# Patient Record
Sex: FEMALE | Race: OTHER | URBAN - METROPOLITAN AREA
[De-identification: names, ages, dates, MRNs, and addresses within clinical notes are randomized per-mention and may not be internally consistent; named-entity substitution may affect disease eponyms.]

---

## 2022-05-02 ENCOUNTER — OFFICE VISIT (OUTPATIENT)
Dept: OBGYN CLINIC | Facility: CLINIC | Age: 42
End: 2022-05-02
Payer: COMMERCIAL

## 2022-05-02 VITALS
SYSTOLIC BLOOD PRESSURE: 110 MMHG | WEIGHT: 148 LBS | HEART RATE: 94 BPM | DIASTOLIC BLOOD PRESSURE: 70 MMHG | BODY MASS INDEX: 27.23 KG/M2 | HEIGHT: 62 IN

## 2022-05-02 DIAGNOSIS — N94.6 DYSMENORRHEA: ICD-10-CM

## 2022-05-02 DIAGNOSIS — Z71.85 HPV VACCINE COUNSELING: ICD-10-CM

## 2022-05-02 DIAGNOSIS — Z30.09 GENERAL COUNSELING AND ADVICE FOR CONTRACEPTIVE MANAGEMENT: ICD-10-CM

## 2022-05-02 DIAGNOSIS — N39.3 SUI (STRESS URINARY INCONTINENCE, FEMALE): ICD-10-CM

## 2022-05-02 DIAGNOSIS — Z12.31 ENCOUNTER FOR SCREENING MAMMOGRAM FOR MALIGNANT NEOPLASM OF BREAST: ICD-10-CM

## 2022-05-02 DIAGNOSIS — Z12.4 SCREENING FOR CERVICAL CANCER: ICD-10-CM

## 2022-05-02 DIAGNOSIS — R10.13 EPIGASTRIC PAIN: ICD-10-CM

## 2022-05-02 DIAGNOSIS — M79.629 PAIN IN AXILLA, UNSPECIFIED LATERALITY: ICD-10-CM

## 2022-05-02 DIAGNOSIS — R10.2 PELVIC PAIN: ICD-10-CM

## 2022-05-02 DIAGNOSIS — R19.8 IRREGULAR BOWEL HABITS: ICD-10-CM

## 2022-05-02 DIAGNOSIS — Z01.411 ENCOUNTER FOR WELL WOMAN EXAM WITH ABNORMAL FINDINGS: Primary | ICD-10-CM

## 2022-05-02 DIAGNOSIS — Z11.3 SCREENING EXAMINATION FOR STD (SEXUALLY TRANSMITTED DISEASE): ICD-10-CM

## 2022-05-02 DIAGNOSIS — N92.1 MENOMETRORRHAGIA: ICD-10-CM

## 2022-05-02 PROBLEM — N92.0 MENORRHAGIA WITH REGULAR CYCLE: Status: ACTIVE | Noted: 2022-05-02

## 2022-05-02 PROBLEM — K58.2 IRRITABLE BOWEL SYNDROME WITH BOTH CONSTIPATION AND DIARRHEA: Status: ACTIVE | Noted: 2019-08-29

## 2022-05-02 PROBLEM — F33.9 MAJOR DEPRESSION, RECURRENT, CHRONIC (HCC): Status: ACTIVE | Noted: 2018-03-10

## 2022-05-02 PROBLEM — L70.0 ACNE VULGARIS: Status: ACTIVE | Noted: 2018-03-10

## 2022-05-02 PROBLEM — R41.840 ATTENTION OR CONCENTRATION DEFICIT: Status: ACTIVE | Noted: 2018-03-10

## 2022-05-02 LAB
BILIRUB UR QL STRIP.AUTO: NEGATIVE
CLARITY UR REFRACT.AUTO: CLEAR
GLUCOSE UR STRIP.AUTO-MCNC: NEGATIVE MG/DL
KETONES UR STRIP.AUTO-MCNC: NEGATIVE MG/DL
LEUKOCYTE ESTERASE UR QL STRIP.AUTO: NEGATIVE
NITRITE UR QL STRIP.AUTO: NEGATIVE
PH UR STRIP.AUTO: 7 [PH] (ref 5–8)
PROT UR STRIP.AUTO-MCNC: NEGATIVE MG/DL
SP GR UR STRIP.AUTO: 1.01 (ref 1–1.03)
UROBILINOGEN UR STRIP.AUTO-MCNC: <2 MG/DL

## 2022-05-02 PROCEDURE — 87086 URINE CULTURE/COLONY COUNT: CPT | Performed by: OBSTETRICS & GYNECOLOGY

## 2022-05-02 PROCEDURE — 87510 GARDNER VAG DNA DIR PROBE: CPT | Performed by: OBSTETRICS & GYNECOLOGY

## 2022-05-02 PROCEDURE — 87491 CHLMYD TRACH DNA AMP PROBE: CPT | Performed by: OBSTETRICS & GYNECOLOGY

## 2022-05-02 PROCEDURE — 81001 URINALYSIS AUTO W/SCOPE: CPT | Performed by: OBSTETRICS & GYNECOLOGY

## 2022-05-02 PROCEDURE — 87591 N.GONORRHOEAE DNA AMP PROB: CPT | Performed by: OBSTETRICS & GYNECOLOGY

## 2022-05-02 PROCEDURE — 87480 CANDIDA DNA DIR PROBE: CPT | Performed by: OBSTETRICS & GYNECOLOGY

## 2022-05-02 PROCEDURE — 87660 TRICHOMONAS VAGIN DIR PROBE: CPT | Performed by: OBSTETRICS & GYNECOLOGY

## 2022-05-02 PROCEDURE — 87624 HPV HI-RISK TYP POOLED RSLT: CPT | Performed by: OBSTETRICS & GYNECOLOGY

## 2022-05-02 RX ORDER — FLUOXETINE HYDROCHLORIDE 20 MG/1
20 CAPSULE ORAL 3 TIMES DAILY
COMMUNITY
Start: 2022-03-23

## 2022-05-02 RX ORDER — DEXTROAMPHETAMINE SACCHARATE, AMPHETAMINE ASPARTATE, DEXTROAMPHETAMINE SULFATE AND AMPHETAMINE SULFATE 5; 5; 5; 5 MG/1; MG/1; MG/1; MG/1
1 TABLET ORAL 2 TIMES DAILY
COMMUNITY
Start: 2022-04-25

## 2022-05-03 LAB
C TRACH DNA SPEC QL NAA+PROBE: NEGATIVE
N GONORRHOEA DNA SPEC QL NAA+PROBE: NEGATIVE

## 2022-05-04 LAB — HPV I/H RISK 1 DNA SPEC QL NAA+PROBE: NEGATIVE

## 2022-05-13 ENCOUNTER — TELEPHONE (OUTPATIENT)
Dept: PHYSICAL THERAPY | Facility: HOSPITAL | Age: 42
End: 2022-05-13

## 2022-05-16 ENCOUNTER — HOSPITAL ENCOUNTER (OUTPATIENT)
Dept: MAMMOGRAPHY | Age: 42
Discharge: HOME | End: 2022-05-16
Attending: OBSTETRICS & GYNECOLOGY
Payer: COMMERCIAL

## 2022-05-16 DIAGNOSIS — Z12.31 ENCOUNTER FOR SCREENING MAMMOGRAM FOR MALIGNANT NEOPLASM OF BREAST: ICD-10-CM

## 2022-05-16 PROCEDURE — 77063 BREAST TOMOSYNTHESIS BI: CPT | Performed by: OBSTETRICS & GYNECOLOGY

## 2022-05-16 PROCEDURE — 77067 SCR MAMMO BI INCL CAD: CPT | Performed by: OBSTETRICS & GYNECOLOGY

## 2022-05-19 PROBLEM — R92.30 DENSE BREASTS: Status: ACTIVE | Noted: 2022-05-16

## 2022-05-19 PROBLEM — R92.2 DENSE BREASTS: Status: ACTIVE | Noted: 2022-05-16

## 2022-05-27 ENCOUNTER — LAB ENCOUNTER (OUTPATIENT)
Dept: LAB | Age: 42
End: 2022-05-27
Attending: OBSTETRICS & GYNECOLOGY
Payer: COMMERCIAL

## 2022-05-27 DIAGNOSIS — Z01.411 ENCOUNTER FOR WELL WOMAN EXAM WITH ABNORMAL FINDINGS: ICD-10-CM

## 2022-05-27 LAB
ALBUMIN SERPL-MCNC: 3.5 G/DL (ref 3.4–5)
ALBUMIN/GLOB SERPL: 0.9 {RATIO} (ref 1–2)
ALP LIVER SERPL-CCNC: 63 U/L
ALT SERPL-CCNC: 36 U/L
ANION GAP SERPL CALC-SCNC: 7 MMOL/L (ref 0–18)
AST SERPL-CCNC: 26 U/L (ref 15–37)
BASOPHILS # BLD AUTO: 0.01 X10(3) UL (ref 0–0.2)
BASOPHILS NFR BLD AUTO: 0.3 %
BILIRUB SERPL-MCNC: 0.3 MG/DL (ref 0.1–2)
BUN BLD-MCNC: 9 MG/DL (ref 7–18)
CALCIUM BLD-MCNC: 8.7 MG/DL (ref 8.5–10.1)
CHLORIDE SERPL-SCNC: 108 MMOL/L (ref 98–112)
CHOLEST SERPL-MCNC: 198 MG/DL (ref ?–200)
CO2 SERPL-SCNC: 25 MMOL/L (ref 21–32)
CREAT BLD-MCNC: 0.93 MG/DL
EOSINOPHIL # BLD AUTO: 0.16 X10(3) UL (ref 0–0.7)
EOSINOPHIL NFR BLD AUTO: 4.3 %
ERYTHROCYTE [DISTWIDTH] IN BLOOD BY AUTOMATED COUNT: 12.7 %
EST. AVERAGE GLUCOSE BLD GHB EST-MCNC: 123 MG/DL (ref 68–126)
FASTING PATIENT LIPID ANSWER: YES
FASTING STATUS PATIENT QL REPORTED: YES
GLOBULIN PLAS-MCNC: 3.8 G/DL (ref 2.8–4.4)
GLUCOSE BLD-MCNC: 110 MG/DL (ref 70–99)
HBA1C MFR BLD: 5.9 % (ref ?–5.7)
HCT VFR BLD AUTO: 41 %
HDLC SERPL-MCNC: 66 MG/DL (ref 40–59)
HGB BLD-MCNC: 13.7 G/DL
IMM GRANULOCYTES # BLD AUTO: 0.01 X10(3) UL (ref 0–1)
IMM GRANULOCYTES NFR BLD: 0.3 %
LDLC SERPL CALC-MCNC: 120 MG/DL (ref ?–100)
LYMPHOCYTES # BLD AUTO: 1.18 X10(3) UL (ref 1–4)
LYMPHOCYTES NFR BLD AUTO: 31.9 %
MCH RBC QN AUTO: 30.9 PG (ref 26–34)
MCHC RBC AUTO-ENTMCNC: 33.4 G/DL (ref 31–37)
MCV RBC AUTO: 92.6 FL
MONOCYTES # BLD AUTO: 0.41 X10(3) UL (ref 0.1–1)
MONOCYTES NFR BLD AUTO: 11.1 %
NEUTROPHILS # BLD AUTO: 1.93 X10 (3) UL (ref 1.5–7.7)
NEUTROPHILS # BLD AUTO: 1.93 X10(3) UL (ref 1.5–7.7)
NEUTROPHILS NFR BLD AUTO: 52.1 %
NONHDLC SERPL-MCNC: 132 MG/DL (ref ?–130)
OSMOLALITY SERPL CALC.SUM OF ELEC: 289 MOSM/KG (ref 275–295)
PLATELET # BLD AUTO: 182 10(3)UL (ref 150–450)
POTASSIUM SERPL-SCNC: 3.9 MMOL/L (ref 3.5–5.1)
PROT SERPL-MCNC: 7.3 G/DL (ref 6.4–8.2)
RBC # BLD AUTO: 4.43 X10(6)UL
SODIUM SERPL-SCNC: 140 MMOL/L (ref 136–145)
TRIGL SERPL-MCNC: 63 MG/DL (ref 30–149)
TSI SER-ACNC: 2.27 MIU/ML (ref 0.36–3.74)
VLDLC SERPL CALC-MCNC: 11 MG/DL (ref 0–30)
WBC # BLD AUTO: 3.7 X10(3) UL (ref 4–11)

## 2022-05-27 PROCEDURE — 80053 COMPREHEN METABOLIC PANEL: CPT

## 2022-05-27 PROCEDURE — 85025 COMPLETE CBC W/AUTO DIFF WBC: CPT

## 2022-05-27 PROCEDURE — 83036 HEMOGLOBIN GLYCOSYLATED A1C: CPT

## 2022-05-27 PROCEDURE — 80061 LIPID PANEL: CPT

## 2022-05-27 PROCEDURE — 36415 COLL VENOUS BLD VENIPUNCTURE: CPT

## 2022-05-27 PROCEDURE — 84443 ASSAY THYROID STIM HORMONE: CPT

## 2022-05-31 ENCOUNTER — HOSPITAL ENCOUNTER (OUTPATIENT)
Dept: MAMMOGRAPHY | Age: 42
Discharge: HOME | End: 2022-05-31
Attending: OBSTETRICS & GYNECOLOGY
Payer: COMMERCIAL

## 2022-05-31 ENCOUNTER — HOSPITAL ENCOUNTER (OUTPATIENT)
Dept: ULTRASOUND IMAGING | Age: 42
Discharge: HOME | End: 2022-05-31
Attending: OBSTETRICS & GYNECOLOGY
Payer: COMMERCIAL

## 2022-05-31 DIAGNOSIS — R92.8 ABNORMAL MAMMOGRAM: ICD-10-CM

## 2022-05-31 PROCEDURE — 77062 BREAST TOMOSYNTHESIS BI: CPT | Performed by: OBSTETRICS & GYNECOLOGY

## 2022-05-31 PROCEDURE — 76642 ULTRASOUND BREAST LIMITED: CPT | Performed by: OBSTETRICS & GYNECOLOGY

## 2022-05-31 PROCEDURE — 77066 DX MAMMO INCL CAD BI: CPT | Performed by: OBSTETRICS & GYNECOLOGY

## 2022-05-31 NOTE — PROGRESS NOTES
Pt aware of results & recommendations for a low carb diet, weight loss, start some weight lifting. Increase fiber & omega 3 fatty acids in diet. She has her pelvic ultrasound scheduled for 6/12/22. Verbalized understanding.

## 2022-06-01 ENCOUNTER — TELEPHONE (OUTPATIENT)
Dept: OBGYN CLINIC | Facility: CLINIC | Age: 42
End: 2022-06-01

## 2022-06-01 NOTE — PROGRESS NOTES
Aware of result/recommendation,verb. Understanding.  She has appt with radiology for biopsy, they spoke to her yest.

## 2022-06-03 ENCOUNTER — HOSPITAL ENCOUNTER (OUTPATIENT)
Dept: MAMMOGRAPHY | Facility: HOSPITAL | Age: 42
Discharge: HOME | End: 2022-06-03
Attending: OBSTETRICS & GYNECOLOGY
Payer: COMMERCIAL

## 2022-06-03 DIAGNOSIS — R92.1 BREAST CALCIFICATIONS: ICD-10-CM

## 2022-06-03 PROCEDURE — 19081 BX BREAST 1ST LESION STRTCTC: CPT | Performed by: OBSTETRICS & GYNECOLOGY

## 2022-06-03 PROCEDURE — 88305 TISSUE EXAM BY PATHOLOGIST: CPT | Performed by: OBSTETRICS & GYNECOLOGY

## 2022-06-06 ENCOUNTER — TELEPHONE (OUTPATIENT)
Dept: GENERAL RADIOLOGY | Facility: HOSPITAL | Age: 42
End: 2022-06-06

## 2022-06-06 PROBLEM — N64.89 RADIAL SCAR OF LEFT BREAST: Status: ACTIVE | Noted: 2022-06-03

## 2022-06-06 NOTE — PROGRESS NOTES
Patient aware of Pathology results and recommendations. Dr Jose Sharp recommendation is Left breast biopsy with multiple benign findings; however radial scars can be associated with malignancy and are often recommended to be removed. Recommend see breast surgeon. Will put in order for Dr. Joaquín Myrikc. Contact information given. Patient verbalized understanding.

## 2022-06-12 ENCOUNTER — HOSPITAL ENCOUNTER (OUTPATIENT)
Dept: ULTRASOUND IMAGING | Age: 42
Discharge: HOME | End: 2022-06-12
Attending: OBSTETRICS & GYNECOLOGY
Payer: COMMERCIAL

## 2022-06-12 ENCOUNTER — HOSPITAL ENCOUNTER (OUTPATIENT)
Dept: ULTRASOUND IMAGING | Age: 42
End: 2022-06-12
Attending: OBSTETRICS & GYNECOLOGY
Payer: COMMERCIAL

## 2022-06-12 DIAGNOSIS — N92.1 MENOMETRORRHAGIA: ICD-10-CM

## 2022-06-12 DIAGNOSIS — N94.6 DYSMENORRHEA: ICD-10-CM

## 2022-06-12 DIAGNOSIS — R10.2 PELVIC PAIN: ICD-10-CM

## 2022-06-12 PROCEDURE — 76830 TRANSVAGINAL US NON-OB: CPT | Performed by: OBSTETRICS & GYNECOLOGY

## 2022-06-12 PROCEDURE — 76856 US EXAM PELVIC COMPLETE: CPT | Performed by: OBSTETRICS & GYNECOLOGY

## 2022-06-14 ENCOUNTER — OFFICE VISIT (OUTPATIENT)
Dept: HEMATOLOGY/ONCOLOGY | Facility: HOSPITAL | Age: 42
End: 2022-06-14
Attending: SURGERY
Payer: COMMERCIAL

## 2022-06-14 ENCOUNTER — OFFICE VISIT (OUTPATIENT)
Facility: LOCATION | Age: 42
End: 2022-06-14
Payer: COMMERCIAL

## 2022-06-14 ENCOUNTER — TELEPHONE (OUTPATIENT)
Facility: LOCATION | Age: 42
End: 2022-06-14

## 2022-06-14 ENCOUNTER — TELEPHONE (OUTPATIENT)
Dept: MAMMOGRAPHY | Facility: HOSPITAL | Age: 42
End: 2022-06-14

## 2022-06-14 VITALS
BODY MASS INDEX: 26 KG/M2 | RESPIRATION RATE: 16 BRPM | OXYGEN SATURATION: 99 % | SYSTOLIC BLOOD PRESSURE: 128 MMHG | WEIGHT: 144 LBS | TEMPERATURE: 98 F | DIASTOLIC BLOOD PRESSURE: 78 MMHG | HEART RATE: 87 BPM

## 2022-06-14 DIAGNOSIS — R22.33 AXILLARY MASS, BILATERAL: ICD-10-CM

## 2022-06-14 DIAGNOSIS — N64.89 RADIAL SCAR OF LEFT BREAST: Primary | ICD-10-CM

## 2022-06-14 PROCEDURE — 99203 OFFICE O/P NEW LOW 30 MIN: CPT | Performed by: SURGERY

## 2022-06-14 NOTE — PROGRESS NOTES
Patient presents to clinic for breast consultation. Left breast biopsy performed on 6/3/22 here to discuss pathology results and plan of care. Patient has no pain at the biopsy site but states she has had axilla pain for at least 7 years. Axilla pain is worse with menses. Sometimes swelling under axilla gets so bad she has trouble getting comfortable. Patient is unsure of any family hx of breast cancer. Breastfeed both of her two children. Medication and allergies reviewed and updated.

## 2022-06-14 NOTE — TELEPHONE ENCOUNTER
[facility] needle localization process of breast for lumpectomy scheduled for 6-20-22 with Dr. Bo Calixto. Procedure explained and all questions answered. Pt to be transported via W/C through [facility] to [facility] in [facility]. Pt verbalized understanding and had no further questions at this time.

## 2022-06-15 RX ORDER — NICOTINE POLACRILEX 4 MG
15 LOZENGE BUCCAL
Status: CANCELLED | OUTPATIENT
Start: 2022-06-15

## 2022-06-15 RX ORDER — DEXTROSE MONOHYDRATE 25 G/50ML
50 INJECTION, SOLUTION INTRAVENOUS
Status: CANCELLED | OUTPATIENT
Start: 2022-06-15

## 2022-06-15 RX ORDER — MULTIVITAMIN
1 TABLET ORAL DAILY
COMMUNITY

## 2022-06-15 RX ORDER — NICOTINE POLACRILEX 4 MG
30 LOZENGE BUCCAL
Status: CANCELLED | OUTPATIENT
Start: 2022-06-15

## 2022-06-20 ENCOUNTER — ANESTHESIA (OUTPATIENT)
Dept: SURGERY | Facility: HOSPITAL | Age: 42
End: 2022-06-20
Payer: COMMERCIAL

## 2022-06-20 ENCOUNTER — ANESTHESIA EVENT (OUTPATIENT)
Dept: SURGERY | Facility: HOSPITAL | Age: 42
End: 2022-06-20
Payer: COMMERCIAL

## 2022-06-20 ENCOUNTER — HOSPITAL ENCOUNTER (OUTPATIENT)
Facility: HOSPITAL | Age: 42
Setting detail: HOSPITAL OUTPATIENT SURGERY
Discharge: HOME | End: 2022-06-20
Attending: SURGERY | Admitting: SURGERY
Payer: COMMERCIAL

## 2022-06-20 ENCOUNTER — HOSPITAL ENCOUNTER (OUTPATIENT)
Dept: MAMMOGRAPHY | Facility: HOSPITAL | Age: 42
Discharge: HOME | End: 2022-06-20
Attending: SURGERY
Payer: COMMERCIAL

## 2022-06-20 VITALS
RESPIRATION RATE: 16 BRPM | DIASTOLIC BLOOD PRESSURE: 66 MMHG | OXYGEN SATURATION: 99 % | TEMPERATURE: 98 F | BODY MASS INDEX: 26.9 KG/M2 | SYSTOLIC BLOOD PRESSURE: 107 MMHG | HEIGHT: 62 IN | HEART RATE: 81 BPM | WEIGHT: 146.19 LBS

## 2022-06-20 DIAGNOSIS — Z20.822 ENCOUNTER FOR PREOPERATIVE SCREENING LABORATORY TESTING FOR COVID-19 VIRUS: Primary | ICD-10-CM

## 2022-06-20 DIAGNOSIS — R22.33 AXILLARY MASS, BILATERAL: ICD-10-CM

## 2022-06-20 DIAGNOSIS — Z01.812 ENCOUNTER FOR PREOPERATIVE SCREENING LABORATORY TESTING FOR COVID-19 VIRUS: Primary | ICD-10-CM

## 2022-06-20 DIAGNOSIS — N64.89 RADIAL SCAR OF LEFT BREAST: ICD-10-CM

## 2022-06-20 LAB
B-HCG UR QL: NEGATIVE
GLUCOSE BLD-MCNC: 110 MG/DL (ref 70–99)
SARS-COV-2 RNA RESP QL NAA+PROBE: NOT DETECTED

## 2022-06-20 PROCEDURE — 82962 GLUCOSE BLOOD TEST: CPT

## 2022-06-20 PROCEDURE — 76098 X-RAY EXAM SURGICAL SPECIMEN: CPT | Performed by: SURGERY

## 2022-06-20 PROCEDURE — 88305 TISSUE EXAM BY PATHOLOGIST: CPT | Performed by: SURGERY

## 2022-06-20 PROCEDURE — 88304 TISSUE EXAM BY PATHOLOGIST: CPT | Performed by: SURGERY

## 2022-06-20 PROCEDURE — 0HBBXZZ EXCISION OF RIGHT UPPER ARM SKIN, EXTERNAL APPROACH: ICD-10-PCS | Performed by: SURGERY

## 2022-06-20 PROCEDURE — 0HBCXZZ EXCISION OF LEFT UPPER ARM SKIN, EXTERNAL APPROACH: ICD-10-PCS | Performed by: SURGERY

## 2022-06-20 PROCEDURE — 81025 URINE PREGNANCY TEST: CPT

## 2022-06-20 PROCEDURE — 19281 PERQ DEVICE BREAST 1ST IMAG: CPT | Performed by: SURGERY

## 2022-06-20 PROCEDURE — 0HBU0ZZ EXCISION OF LEFT BREAST, OPEN APPROACH: ICD-10-PCS | Performed by: SURGERY

## 2022-06-20 RX ORDER — ACETAMINOPHEN 500 MG
1000 TABLET ORAL ONCE AS NEEDED
Status: COMPLETED | OUTPATIENT
Start: 2022-06-20 | End: 2022-06-20

## 2022-06-20 RX ORDER — NALOXONE HYDROCHLORIDE 0.4 MG/ML
80 INJECTION, SOLUTION INTRAMUSCULAR; INTRAVENOUS; SUBCUTANEOUS AS NEEDED
Status: DISCONTINUED | OUTPATIENT
Start: 2022-06-20 | End: 2022-06-20

## 2022-06-20 RX ORDER — PROCHLORPERAZINE EDISYLATE 5 MG/ML
5 INJECTION INTRAMUSCULAR; INTRAVENOUS EVERY 8 HOURS PRN
Status: DISCONTINUED | OUTPATIENT
Start: 2022-06-20 | End: 2022-06-20

## 2022-06-20 RX ORDER — ACETAMINOPHEN AND CODEINE PHOSPHATE 300; 30 MG/1; MG/1
1-2 TABLET ORAL EVERY 6 HOURS PRN
Qty: 20 TABLET | Refills: 0 | Status: SHIPPED | OUTPATIENT
Start: 2022-06-20 | End: 2022-06-27

## 2022-06-20 RX ORDER — SODIUM CHLORIDE, SODIUM LACTATE, POTASSIUM CHLORIDE, CALCIUM CHLORIDE 600; 310; 30; 20 MG/100ML; MG/100ML; MG/100ML; MG/100ML
INJECTION, SOLUTION INTRAVENOUS CONTINUOUS
Status: DISCONTINUED | OUTPATIENT
Start: 2022-06-20 | End: 2022-06-20

## 2022-06-20 RX ORDER — ONDANSETRON 2 MG/ML
INJECTION INTRAMUSCULAR; INTRAVENOUS AS NEEDED
Status: DISCONTINUED | OUTPATIENT
Start: 2022-06-20 | End: 2022-06-20 | Stop reason: SURG

## 2022-06-20 RX ORDER — DEXTROSE MONOHYDRATE 25 G/50ML
50 INJECTION, SOLUTION INTRAVENOUS
Status: DISCONTINUED | OUTPATIENT
Start: 2022-06-20 | End: 2022-06-20

## 2022-06-20 RX ORDER — KETOROLAC TROMETHAMINE 30 MG/ML
INJECTION, SOLUTION INTRAMUSCULAR; INTRAVENOUS AS NEEDED
Status: DISCONTINUED | OUTPATIENT
Start: 2022-06-20 | End: 2022-06-20 | Stop reason: SURG

## 2022-06-20 RX ORDER — HYDROMORPHONE HYDROCHLORIDE 1 MG/ML
0.6 INJECTION, SOLUTION INTRAMUSCULAR; INTRAVENOUS; SUBCUTANEOUS EVERY 5 MIN PRN
Status: DISCONTINUED | OUTPATIENT
Start: 2022-06-20 | End: 2022-06-20

## 2022-06-20 RX ORDER — BUPIVACAINE HYDROCHLORIDE AND EPINEPHRINE 5; 5 MG/ML; UG/ML
INJECTION, SOLUTION EPIDURAL; INTRACAUDAL; PERINEURAL AS NEEDED
Status: DISCONTINUED | OUTPATIENT
Start: 2022-06-20 | End: 2022-06-20 | Stop reason: HOSPADM

## 2022-06-20 RX ORDER — IBUPROFEN 600 MG/1
600 TABLET ORAL ONCE AS NEEDED
Status: DISCONTINUED | OUTPATIENT
Start: 2022-06-20 | End: 2022-06-20

## 2022-06-20 RX ORDER — EPHEDRINE SULFATE 50 MG/ML
INJECTION INTRAVENOUS AS NEEDED
Status: DISCONTINUED | OUTPATIENT
Start: 2022-06-20 | End: 2022-06-20 | Stop reason: SURG

## 2022-06-20 RX ORDER — MEPERIDINE HYDROCHLORIDE 25 MG/ML
INJECTION INTRAMUSCULAR; INTRAVENOUS; SUBCUTANEOUS
Status: DISCONTINUED
Start: 2022-06-20 | End: 2022-06-20 | Stop reason: WASHOUT

## 2022-06-20 RX ORDER — ACETAMINOPHEN 500 MG
1000 TABLET ORAL ONCE
Status: DISCONTINUED | OUTPATIENT
Start: 2022-06-20 | End: 2022-06-20 | Stop reason: HOSPADM

## 2022-06-20 RX ORDER — SCOLOPAMINE TRANSDERMAL SYSTEM 1 MG/1
1 PATCH, EXTENDED RELEASE TRANSDERMAL ONCE
Status: DISCONTINUED | OUTPATIENT
Start: 2022-06-20 | End: 2022-06-20 | Stop reason: HOSPADM

## 2022-06-20 RX ORDER — HYDROCODONE BITARTRATE AND ACETAMINOPHEN 5; 325 MG/1; MG/1
2 TABLET ORAL ONCE AS NEEDED
Status: COMPLETED | OUTPATIENT
Start: 2022-06-20 | End: 2022-06-20

## 2022-06-20 RX ORDER — HYDROMORPHONE HYDROCHLORIDE 1 MG/ML
0.4 INJECTION, SOLUTION INTRAMUSCULAR; INTRAVENOUS; SUBCUTANEOUS EVERY 5 MIN PRN
Status: DISCONTINUED | OUTPATIENT
Start: 2022-06-20 | End: 2022-06-20

## 2022-06-20 RX ORDER — HYDROMORPHONE HYDROCHLORIDE 1 MG/ML
INJECTION, SOLUTION INTRAMUSCULAR; INTRAVENOUS; SUBCUTANEOUS
Status: COMPLETED
Start: 2022-06-20 | End: 2022-06-20

## 2022-06-20 RX ORDER — ONDANSETRON 2 MG/ML
4 INJECTION INTRAMUSCULAR; INTRAVENOUS EVERY 6 HOURS PRN
Status: DISCONTINUED | OUTPATIENT
Start: 2022-06-20 | End: 2022-06-20

## 2022-06-20 RX ORDER — DEXAMETHASONE SODIUM PHOSPHATE 4 MG/ML
VIAL (ML) INJECTION AS NEEDED
Status: DISCONTINUED | OUTPATIENT
Start: 2022-06-20 | End: 2022-06-20 | Stop reason: SURG

## 2022-06-20 RX ORDER — NICOTINE POLACRILEX 4 MG
30 LOZENGE BUCCAL
Status: DISCONTINUED | OUTPATIENT
Start: 2022-06-20 | End: 2022-06-20

## 2022-06-20 RX ORDER — HYDROCODONE BITARTRATE AND ACETAMINOPHEN 5; 325 MG/1; MG/1
1 TABLET ORAL ONCE AS NEEDED
Status: COMPLETED | OUTPATIENT
Start: 2022-06-20 | End: 2022-06-20

## 2022-06-20 RX ORDER — INSULIN ASPART 100 [IU]/ML
INJECTION, SOLUTION INTRAVENOUS; SUBCUTANEOUS ONCE
Status: DISCONTINUED | OUTPATIENT
Start: 2022-06-20 | End: 2022-06-20

## 2022-06-20 RX ORDER — HYDROMORPHONE HYDROCHLORIDE 1 MG/ML
0.2 INJECTION, SOLUTION INTRAMUSCULAR; INTRAVENOUS; SUBCUTANEOUS EVERY 5 MIN PRN
Status: DISCONTINUED | OUTPATIENT
Start: 2022-06-20 | End: 2022-06-20

## 2022-06-20 RX ORDER — NICOTINE POLACRILEX 4 MG
15 LOZENGE BUCCAL
Status: DISCONTINUED | OUTPATIENT
Start: 2022-06-20 | End: 2022-06-20

## 2022-06-20 RX ORDER — MIDAZOLAM HYDROCHLORIDE 1 MG/ML
INJECTION INTRAMUSCULAR; INTRAVENOUS AS NEEDED
Status: DISCONTINUED | OUTPATIENT
Start: 2022-06-20 | End: 2022-06-20 | Stop reason: SURG

## 2022-06-20 RX ORDER — CEFAZOLIN SODIUM/WATER 2 G/20 ML
2 SYRINGE (ML) INTRAVENOUS ONCE
Status: COMPLETED | OUTPATIENT
Start: 2022-06-20 | End: 2022-06-20

## 2022-06-20 RX ORDER — DIAZEPAM 5 MG/1
5 TABLET ORAL AS NEEDED
Status: DISCONTINUED | OUTPATIENT
Start: 2022-06-20 | End: 2022-06-20 | Stop reason: HOSPADM

## 2022-06-20 RX ADMIN — EPHEDRINE SULFATE 10 MG: 50 INJECTION INTRAVENOUS at 11:10:00

## 2022-06-20 RX ADMIN — MIDAZOLAM HYDROCHLORIDE 2 MG: 1 INJECTION INTRAMUSCULAR; INTRAVENOUS at 10:27:00

## 2022-06-20 RX ADMIN — SODIUM CHLORIDE, SODIUM LACTATE, POTASSIUM CHLORIDE, CALCIUM CHLORIDE: 600; 310; 30; 20 INJECTION, SOLUTION INTRAVENOUS at 12:04:00

## 2022-06-20 RX ADMIN — EPHEDRINE SULFATE 10 MG: 50 INJECTION INTRAVENOUS at 11:21:00

## 2022-06-20 RX ADMIN — CEFAZOLIN SODIUM/WATER 2 G: 2 G/20 ML SYRINGE (ML) INTRAVENOUS at 10:31:00

## 2022-06-20 RX ADMIN — KETOROLAC TROMETHAMINE 30 MG: 30 INJECTION, SOLUTION INTRAMUSCULAR; INTRAVENOUS at 11:06:00

## 2022-06-20 RX ADMIN — DEXAMETHASONE SODIUM PHOSPHATE 4 MG: 4 MG/ML VIAL (ML) INJECTION at 10:32:00

## 2022-06-20 RX ADMIN — SODIUM CHLORIDE, SODIUM LACTATE, POTASSIUM CHLORIDE, CALCIUM CHLORIDE: 600; 310; 30; 20 INJECTION, SOLUTION INTRAVENOUS at 10:33:00

## 2022-06-20 RX ADMIN — ONDANSETRON 4 MG: 2 INJECTION INTRAMUSCULAR; INTRAVENOUS at 10:32:00

## 2022-06-20 NOTE — OPERATIVE REPORT
[facility]  Op Note    [facility] Page Location: [address] [place] [address] [id number] MRN [id number]   Admission Date 6/20/2022 Operation Date 6/20/2022   Attending Physician Abiola Hill MD Operating Physician Jinger Libman, MD     DATE OF OPERATION:  6/20/2022    PREOPERATIVE DIAGNOSIS: Left breast radial scar, bilateral axillary breast tissue    POSTOPERATIVE DIAGNOSIS: Left breast radial scar, bilateral axillary breast tissue    PROCEDURE PERFORMED:  1. X-ray localized left breast lumpectomy  2. Excision of 11.0 cm left axillary tissue with 10.4 cm layered closure  3. Excision of 11.4 cm right axillary tissue with 10.9 cm layered closure    SURGEON:  Jinger Libman, M.D.    ASSISTANT: Jw Mensah PA-C (Her assistance was required to help retract the tissue for adequate dissection and resection of the appropriate tissue. She also helped with wound closure.)    ANESTHESIA:  General    SPECIMEN:  1. Left breast lumpectomy  2. Left axillary tissue  3. Right axillary tissue    ESTIMATED BLOOD LOSS: 20 mL    COMPLICATIONS: None. INDICATIONS: The patient is a 41-year-old female who underwent routine imaging, followed by additional views. Ultimately, a biopsy revealed radial scar. Because of the risk of upstaging, she was offered excision of this area. The risks, benefits, alternatives were discussed in detail with the patient. Risks included, but were not limited to, seroma development, infection and bleeding. We also discussed the possibility of upstaging of her pathology which would require surgery on another day. The patient also has axillary breast tissue that swells and causes discomfort. She wishes for this to be excised. Similar risks, benefits and benefits were discussed. She was agreeable to proceed with the operation. PROCEDURE: After informed consent was obtained, the patient was taken to the radiology suite where a wire was placed to localize her breast lesion.  She was then brought up to the operating room where anesthesia was induced. The patient's left breast was prepped and draped in the usual sterile fashion. The tissue was locally anesthetized with 0.5% Marcaine with epinephrine. An incision was made through the skin with a 15 blade scalpel. Cautery was used to obtain hemostasis. The tissue that encircled the wire was then grasped. Metzenbaums were used to sharply dissect this tissue free. The wire was delivered into the wound, and the tissue ultimately excised. The specimen was marked long stitch lateral, short stitch superior. Faxitron image revealed the clip within the tissue. Cautery was then used to obtain hemostasis. The wound was irrigated with normal saline. The incision was then closed in 2 layers, using a 3-0 Vicryl in the deep layer and a 4-0 Vicryl in the subcuticular skin. Attention was turned to the axillary tissue on the left. Using a 15 blade scalpel, an incision was made to encompass the extra tissue. Cautery was used to obtain hemostasis and to continue the dissection down through the fibrofatty tissue until the mass was completely excised. Cautery was used to obtain hemostasis. The incision was irrigated normal saline. The incision was then closed in 2 layers, using a 3-0 Vicryl in the deep layer and a 4-0 Vicryl in the subcuticular skin. Attention was turned to the axillary tissue on the right. In the same fashion, a 15 blade scalpel was used to make an incision to encompass the tissue. Cautery was used to obtain hemostasis and to continue the dissection down through the tissue until the mass was completely excised. Cautery was used to obtain hemostasis. The incision was irrigated normal saline. The incision was then closed in 2 layers, using a 3-0 Vicryl on the deep layer and a 4-0 Vicryl on the subcuticular skin. Steri-Strips were placed across the incisions as well as sterile dressings.  The patient tolerated the procedure well, was extubated in the OR, and was transferred to the PACU in good condition.      Dioni Olson MD

## 2022-06-20 NOTE — IMAGING NOTE
Assisted  with mammography guided  needle localization of the left breast.   Silvana Green identified with spelling of name and date of birth. Medications and allergies reviewed. NKDA reported. History:    Radial scar of left breast   Axillary mass, bilateral   Surgery:  LEFT BREAST LUMPECTOMY WITH X-RAY LOCALIZATION AND EXCISION OF BILATERAL AXILLARY TISSUE. Order verified. Procedure explained and questions answered. Silvana Green verbalized understanding and agreement. 8861: Written consent obtained. 6106: Scans taken by Idalia-mammography technologist    1783: Site prepped in a sterile manner. 0840: Dr. Lolly Phan  present    0840: Time out complete    [phone]: Lidocaine administered for anesthetic affect. 9608: Ashley 20G x 7.5cm needle placed  Emotional support provided. Silvana Green tolerated procedure well. Site cleaned. Wire secured with sterile 4x4 gauze dressing, satin tape, and a styrofoam cup.    8311: Silvana Green transported via wheelchair to pre-op/surgery holding in stable condition. MsKailee Karthik Briggs without complaints or concerns at this time.

## 2022-06-20 NOTE — ANESTHESIA PROCEDURE NOTES
Airway  Date/Time: 6/20/2022 10:32 AM  Urgency: elective      General Information and Staff    Patient location during procedure: OR  Anesthesiologist: Cindy Blake MD  Performed: anesthesiologist     Indications and Patient Condition  Indications for airway management: anesthesia  Sedation level: deep  Preoxygenated: yes  Patient position: sniffing  Mask difficulty assessment: 1 - vent by mask    Final Airway Details  Final airway type: supraglottic airway      Successful airway: classic  Size 3      Number of attempts at approach: 1

## 2022-06-20 NOTE — ANESTHESIA POSTPROCEDURE EVALUATION
[facility] Page Patient Status:  Hospital Outpatient Surgery   Age/Gender 36year old female MRN [id number]   [facility] SURGERY Attending Abiola Hill MD   Hosp Day # 0 PCP No primary care provider on file. Anesthesia Post-op Note    LEFT BREAST LUMPECTOMY WITH X-RAY LOCALIZATION AND EXCISION OF BILATERAL AXILLARY TISSUE. Procedure Summary     Date: 06/20/22 Room / Location: [facility] OR 18 / [facility] OR    Anesthesia Start: 1026 Anesthesia Stop:     Procedure: LEFT BREAST LUMPECTOMY WITH X-RAY LOCALIZATION AND EXCISION OF BILATERAL AXILLARY TISSUE. (Left Breast) Diagnosis:       Radial scar of left breast      Axillary mass, bilateral      (Radial scar of left breast [N64.89]Axillary mass, bilateral [R22.33])    Surgeons: Abiola Hill MD Anesthesiologist: Herrera Partida MD    Anesthesia Type: general ASA Status: 2          Anesthesia Type: No value filed. Vitals Value Taken Time   /67 06/20/22 1204   Temp 98 06/20/22 1204   Pulse 73 06/20/22 1204   Resp 16 06/20/22 1204   SpO2 98 06/20/22 1204       Patient Location: PACU    Anesthesia Type: general    Airway Patency: patent    Postop Pain Control: adequate    Mental Status: mildly sedated but able to meaningfully participate in the post-anesthesia evaluation    Nausea/Vomiting: none    Cardiopulmonary/Hydration status: stable euvolemic    Complications: no apparent anesthesia related complications    Postop vital signs: stable    Dental Exam: Unchanged from Preop    Patient to be discharged from PACU when criteria met.

## 2022-06-20 NOTE — INTERVAL H&P NOTE
Pre-op Diagnosis: Radial scar of left breast [N64.89]  Axillary mass, bilateral [R22.33]    The above referenced H&P was reviewed by Sohpie Sarah MD on 6/20/2022, the patient was examined and no significant changes have occurred in the patient's condition since the H&P was performed. I discussed with the patient and/or legal representative the potential benefits, risks and side effects of this procedure; the likelihood of the patient achieving goals; and potential problems that might occur during recuperation. I discussed reasonable alternatives to the procedure, including risks, benefits and side effects related to the alternatives and risks related to not receiving this procedure. We will proceed with procedure as planned.

## 2022-06-27 ENCOUNTER — OFFICE VISIT (OUTPATIENT)
Facility: LOCATION | Age: 42
End: 2022-06-27

## 2022-06-27 VITALS
TEMPERATURE: 98 F | SYSTOLIC BLOOD PRESSURE: 114 MMHG | HEART RATE: 78 BPM | WEIGHT: 146 LBS | DIASTOLIC BLOOD PRESSURE: 69 MMHG | BODY MASS INDEX: 26.87 KG/M2 | HEIGHT: 62 IN

## 2022-06-27 DIAGNOSIS — Z98.890 POST-OPERATIVE STATE: Primary | ICD-10-CM

## 2022-07-15 ENCOUNTER — OFFICE VISIT (OUTPATIENT)
Facility: LOCATION | Age: 42
End: 2022-07-15

## 2022-07-15 VITALS
BODY MASS INDEX: 26.87 KG/M2 | HEIGHT: 62 IN | TEMPERATURE: 99 F | WEIGHT: 146 LBS | DIASTOLIC BLOOD PRESSURE: 80 MMHG | SYSTOLIC BLOOD PRESSURE: 116 MMHG | HEART RATE: 79 BPM

## 2022-07-15 DIAGNOSIS — Z98.890 POST-OPERATIVE STATE: ICD-10-CM

## 2022-07-15 DIAGNOSIS — N64.89 RADIAL SCAR OF LEFT BREAST: Primary | ICD-10-CM

## 2022-07-15 PROCEDURE — 3008F BODY MASS INDEX DOCD: CPT | Performed by: STUDENT IN AN ORGANIZED HEALTH CARE EDUCATION/TRAINING PROGRAM

## 2022-07-15 PROCEDURE — 3079F DIAST BP 80-89 MM HG: CPT | Performed by: STUDENT IN AN ORGANIZED HEALTH CARE EDUCATION/TRAINING PROGRAM

## 2022-07-15 PROCEDURE — 99024 POSTOP FOLLOW-UP VISIT: CPT | Performed by: STUDENT IN AN ORGANIZED HEALTH CARE EDUCATION/TRAINING PROGRAM

## 2022-07-15 PROCEDURE — 3074F SYST BP LT 130 MM HG: CPT | Performed by: STUDENT IN AN ORGANIZED HEALTH CARE EDUCATION/TRAINING PROGRAM

## 2024-03-23 ENCOUNTER — APPOINTMENT (OUTPATIENT)
Dept: GENERAL RADIOLOGY | Facility: HOSPITAL | Age: 44
End: 2024-03-23
Payer: COMMERCIAL

## 2024-03-23 ENCOUNTER — HOSPITAL ENCOUNTER (EMERGENCY)
Facility: HOSPITAL | Age: 44
Discharge: HOME | End: 2024-03-23
Attending: STUDENT IN AN ORGANIZED HEALTH CARE EDUCATION/TRAINING PROGRAM
Payer: COMMERCIAL

## 2024-03-23 VITALS
HEIGHT: 62 IN | HEART RATE: 72 BPM | OXYGEN SATURATION: 97 % | DIASTOLIC BLOOD PRESSURE: 77 MMHG | SYSTOLIC BLOOD PRESSURE: 115 MMHG | WEIGHT: 160 LBS | BODY MASS INDEX: 29.44 KG/M2 | RESPIRATION RATE: 18 BRPM | TEMPERATURE: 97 F

## 2024-03-23 DIAGNOSIS — J18.9 COMMUNITY ACQUIRED PNEUMONIA OF RIGHT MIDDLE LOBE OF LUNG: Primary | ICD-10-CM

## 2024-03-23 LAB
FLUAV + FLUBV RNA SPEC NAA+PROBE: NEGATIVE
FLUAV + FLUBV RNA SPEC NAA+PROBE: NEGATIVE
RSV RNA SPEC NAA+PROBE: NEGATIVE
SARS-COV-2 RNA RESP QL NAA+PROBE: NOT DETECTED

## 2024-03-23 PROCEDURE — 71046 X-RAY EXAM CHEST 2 VIEWS: CPT | Performed by: STUDENT IN AN ORGANIZED HEALTH CARE EDUCATION/TRAINING PROGRAM

## 2024-03-23 PROCEDURE — 99283 EMERGENCY DEPT VISIT LOW MDM: CPT

## 2024-03-23 PROCEDURE — 0241U SARS-COV-2/FLU A AND B/RSV BY PCR (GENEXPERT): CPT | Performed by: STUDENT IN AN ORGANIZED HEALTH CARE EDUCATION/TRAINING PROGRAM

## 2024-03-23 PROCEDURE — 0241U SARS-COV-2/FLU A AND B/RSV BY PCR (GENEXPERT): CPT

## 2024-03-23 PROCEDURE — 99284 EMERGENCY DEPT VISIT MOD MDM: CPT

## 2024-03-23 RX ORDER — AZITHROMYCIN 250 MG/1
TABLET, FILM COATED ORAL
Qty: 6 TABLET | Refills: 0 | Status: SHIPPED | OUTPATIENT
Start: 2024-03-23 | End: 2024-03-28

## 2024-03-23 RX ORDER — AMOXICILLIN AND CLAVULANATE POTASSIUM 875; 125 MG/1; MG/1
1 TABLET, FILM COATED ORAL 2 TIMES DAILY
Qty: 20 TABLET | Refills: 0 | Status: SHIPPED | OUTPATIENT
Start: 2024-03-23 | End: 2024-04-02

## 2024-03-24 NOTE — ED INITIAL ASSESSMENT (HPI)
Sick x 3 weeks; was rx zpak and prednisone, and inhaler and tessalon pearls. Feels a little bit better but still feels nasal congestion at this time.     Wants xray because she is concerned with her lungs.

## 2024-03-24 NOTE — ED PROVIDER NOTES
Patient Seen in: [facility] Emergency Department      History     Chief Complaint   Patient presents with    Cough     Stated Complaint: cough and fatigue x3 weeks. pt states the only reason why she is here is becaus*    Subjective:   HPI    43-year-old female presented to the emergency room with reports of cough for the last 3 weeks.  She was given albuterol and also an antibiotic.  She states she feels better now than she did last week however still has this persistent cough.  She has had no fevers chills or bodyaches.    Objective:   Past Medical History:   Diagnosis Date    Accessory breast tissue of axilla     says she had very big enlargement in the arm pits & leaking milk while breastfeeding.     Acne vulgaris 3/10/2018    ADHD (attention deficit hyperactivity disorder)     Attention or concentration deficit 3/10/2018    Cervical high risk HPV (human papillomavirus) test positive 04/29/2011    Per CareEverywhere +HPV (no pap result is noted though)     Dense breasts 05/16/2022    c - Heterogeneously dense    Depression     GERD (gastroesophageal reflux disease)     Gestational diabetes (HCC)     with second pregnancy    History of pelvic ultrasound 06/12/2022    LMP 5/23/22. Cycle day #21. Right ovary approx 1.5 cm simple appearing follicle. Uterus itself mild heterogeneity. Anterior uterine wall slightly thicker than posterior wall. This can indicate presence of adenomyosis. By this cycle day, would have suspected ovulation to have occurred, but does not look like ovulation occurred.    Human papilloma virus (HPV) type 9 vaccine administered     Hyperlipidemia 2011    Internal hemorrhoids     Irritable bowel syndrome with both constipation and diarrhea 08/29/2019    IBS - usually stools fluctuates with stress. Has internal hemorrhoids at times with harder stool. Eating habits can be irregular and she is aware     Major depression, recurrent, chronic (HCC) 3/10/2018    Osteoarthritis     Overweight  (BMI 25.0-29.9)     Pap smear for cervical cancer screening 05/02/2022    Pap & HPV negative.     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 Also with Pseudoangiomatous stromal hyperplasia (PASH), sclerosing adenosis, apocrine metaplasia, columnar cell change, microcysts, and additional microcalcifications    Tension headache               Past Surgical History:   Procedure Laterality Date    BREAST BIOPSY Left 06/03/2022    fragments of radial scar with microcalcifications. Pseudoangiomatous stromal hyperplasia (PASH), sclerosing adenosis, apocrine metaplasia, columnar cell change, microcysts, and additional microcalcifications    COLPOSCOPY, ENTIRE VAGINA, W/CERVIX IF PRESENT      2010 or 11    LUMPECTOMY LEFT  06/20/2022    X-ray localized left breast lumpectomy, Excision of 11.0 cm left axillary tissue with 10.4 cm layered closure, Excision of 11.4 cm right axillary tissue with 10.9 cm layered closure. Dr. Yari Marie, [facility].     OTHER SURGICAL HISTORY Bilateral 06/20/2022    Done at time of left lumpectomy: Excision of 11.0 cm left axillary tissue with 10.4 cm layered closure, Excision of 11.4 cm right axillary tissue with 10.9 cm layered closure. Dr. Yari aMrie, [facility].     WISDOM TEETH REMOVED                  Social History     Socioeconomic History    Marital status:    Tobacco Use    Smoking status: Never    Smokeless tobacco: Never   Vaping Use    Vaping Use: Every day    Substances: Nicotine    Devices: Disposable   Substance and Sexual Activity    Alcohol use: Yes     Comment: soc    Drug use: Never    Sexual activity: Yes     Partners: Male     Birth control/protection: Rhythm              Review of Systems    Positive for stated complaint: cough and fatigue x3 weeks. pt states the only reason why  she is here is becaus*  Other systems are as noted in HPI.  Constitutional and vital signs reviewed.      All other systems reviewed and negative except as noted above.    Physical Exam     ED Triage Vitals [03/23/24 2024]   /78   Pulse 76   Resp 18   Temp 96.8 °F (36 °C)   Temp src Temporal   SpO2 93 %   O2 Device None (Room air)       Current:/77   Pulse 72   Temp 96.8 °F (36 °C) (Temporal)   Resp 18   Ht 157.5 cm (5' 2\")   Wt 72.6 kg   LMP 03/19/2024   SpO2 97%   BMI 29.26 kg/m²         Physical Exam  Vitals and nursing note reviewed.   Constitutional:       General: She is not in acute distress.     Appearance: Normal appearance.   HENT:      Head: Normocephalic.      Nose: Nose normal.      Mouth/Throat:      Mouth: Mucous membranes are moist.   Eyes:      Extraocular Movements: Extraocular movements intact.      Pupils: Pupils are equal, round, and reactive to light.   Cardiovascular:      Rate and Rhythm: Normal rate and regular rhythm.      Pulses: Normal pulses.      Heart sounds: Normal heart sounds.   Pulmonary:      Effort: Pulmonary effort is normal.      Comments: Slight diminished breath sounds in R mid to lower lung fields  Abdominal:      General: Abdomen is flat. Bowel sounds are normal. There is no distension.      Palpations: Abdomen is soft.      Tenderness: There is no abdominal tenderness. There is no right CVA tenderness, left CVA tenderness, guarding or rebound.      Hernia: No hernia is present.   Musculoskeletal:         General: No swelling or tenderness. Normal range of motion.      Cervical back: Normal range of motion.   Skin:     General: Skin is warm and dry.   Neurological:      Mental Status: She is alert and oriented to person, place, and time. Mental status is at baseline.   Psychiatric:         Mood and Affect: Mood normal.               ED Course     Labs Reviewed   SARS-COV-2/FLU A AND B/RSV BY PCR (GENEXPERT) - Normal    Narrative:     This test is  intended for the qualitative detection and differentiation of SARS-CoV-2, influenza A, influenza B, and respiratory syncytial virus (RSV) viral RNA in nasopharyngeal or nares swabs from individuals suspected of respiratory viral infection consistent with COVID-19 by their healthcare provider. Signs and symptoms of respiratory viral infection due to SARS-CoV-2, influenza, and RSV can be similar.    Test performed using the Xpert Xpress SARS-CoV-2/FLU/RSV (real time RT-PCR)  assay on the Gene[employer]pert instrument, [employer], [place] [address].   This test is being used under the Food and Drug Administration's Emergency Use Authorization.    The authorized Fact Sheet for Healthcare Providers for this assay is available upon request from the laboratory.     XR CHEST PA + LAT CHEST (CPT=71046)    Result Date: 3/23/2024  CONCLUSION:  Some patchy airspace opacities are present within the right middle lobe suspicious for areas of pneumonia.  Follow-up is recommended to ensure resolution.  If clinical symptoms persist then consider CT.   LOCATION:  [facility]   Dictated by (CST): Levi Morrow MD on 3/23/2024 at 9:03 PM     Finalized by (CST): Levi Morrow MD on 3/23/2024 at 9:04 PM       [facility]         Medical Decision Making  The patient presents to the emergency room with reports of persistent cough    Differential includes pneumonia, viral respiratory infection, pleural effusion    On exam the patient is not tachypneic and she is saturating above 95% on room air.  She does have some diminished breath sounds in the right lung fields.  I reviewed the patient's chest x-ray images as well as the radiology report and agree with the findings that show patchy opacities in the right middle lobe suspicious for pneumonia.    Given these findings and patient's persistent symptoms she will be discharged on antibiotics to treat for commune acquired pneumonia.  I have discussed specific reasons to return to the ER for IV antibiotics and admission  which include fever, malaise, worsening respiratory symptoms which include shortness of breath chest pain connate any other signs respiratory stress.  Patient voiced understanding.  She been encouraged to follow-up with her primary as well within a week or return to ER sooner if her symptoms do not appear to be improving.    Disposition and Plan     Clinical Impression:  1. Community acquired pneumonia of right middle lobe of lung         Disposition:  Discharge  3/23/2024 10:10 pm    Follow-up:  Jonathan Flores MD  [address]  [address] [address]  [phone]    Follow up            Medications Prescribed:  Discharge Medication List as of 3/23/2024 10:12 PM        START taking these medications    Details   azithromycin (ZITHROMAX Z-CECILE) 250 MG Oral Tab 500 mg once followed by 250 mg daily x 4 days, Normal, Disp-6 tablet, R-0      amoxicillin clavulanate 875-125 MG Oral Tab Take 1 tablet by mouth 2 (two) times daily for 10 days., Normal, Disp-20 tablet, R-0

## (undated) DEVICE — 3M™ STERI-STRIP™ REINFORCED ADHESIVE SKIN CLOSURES, R1547, 1/2 IN X 4 IN (12 MM X 100 MM), 6 STRIPS/ENVELOPE: Brand: 3M™ STERI-STRIP™

## (undated) DEVICE — SUT SILK 0 FSL 678G

## (undated) DEVICE — STERILE POLYISOPRENE POWDER-FREE SURGICAL GLOVES: Brand: PROTEXIS

## (undated) DEVICE — LIGHT HANDLE

## (undated) DEVICE — UNDYED BRAIDED (POLYGLACTIN 910), SYNTHETIC ABSORBABLE SUTURE: Brand: COATED VICRYL

## (undated) DEVICE — SLEEVE KENDALL SCD EXPRESS MED

## (undated) DEVICE — BRA SURGICAL ELIZABETH PINK L

## (undated) DEVICE — SOLUTION  .9 1000ML BTL

## (undated) DEVICE — BREAST-HERNIA-PORT CDS-LF: Brand: MEDLINE INDUSTRIES, INC.

## (undated) DEVICE — 3M(TM) MICROPORE TAPE DISPENSER 1535-2: Brand: 3M™ MICROPORE™

## (undated) DEVICE — GAUZE SPONGES,USP TYPE VII GAUZE, 12 PLY: Brand: CURITY

## (undated) DEVICE — VIOLET BRAIDED (POLYGLACTIN 910), SYNTHETIC ABSORBABLE SUTURE: Brand: COATED VICRYL

## (undated) NOTE — Clinical Note
I had the pleasure of seeing [facility] Page on 6/14/2022. Please see my attached note.     Nishi Bob MD FACS  [facility]Surgery